# Patient Record
Sex: FEMALE | Race: WHITE
[De-identification: names, ages, dates, MRNs, and addresses within clinical notes are randomized per-mention and may not be internally consistent; named-entity substitution may affect disease eponyms.]

---

## 2019-12-27 ENCOUNTER — HOSPITAL ENCOUNTER (OUTPATIENT)
Dept: HOSPITAL 63 - MAMMO | Age: 54
Discharge: HOME | End: 2019-12-27
Attending: PHYSICIAN ASSISTANT
Payer: COMMERCIAL

## 2019-12-27 DIAGNOSIS — Z12.31: Primary | ICD-10-CM

## 2019-12-27 PROCEDURE — 77067 SCR MAMMO BI INCL CAD: CPT

## 2019-12-30 NOTE — RAD
DATE: 12/27/2019



EXAM: DIGITAL SCREEN BILAT W/CAD



HISTORY: Routine screening



COMPARISON: Prior exams are from more than 10 years ago and are not available.



This study was interpreted with the benefit of Computerized Aided Detection

(CAD).





Breast Density: SCATTERED The breast parenchyma shows scattered fibroglandular

densities. Breast parenchyma level B.





FINDINGS: Asymmetry involving the left upper breast posteriorly is present

approximately 6.6 cm from the nipple. Asymmetry involving the right central

MLO view is also present. No suspicious calcifications or distortion.  





IMPRESSION: Indeterminate asymmetries







BI-RADS CATEGORY: 0 INCOMPLETE: NEEDS ADDITIONAL IMAGING EVALUATION AND/OR

PRIOR MAMMOGRAMS FOR COMPARISON.



RECOMMENDED FOLLOW-UP: ADD ADDITIONAL IMAGING. Spot compression imaging of the

right and left breast at the upper posterior aspect recommended. Ultrasound

may be needed bilaterally.



PQRS compliance statement: Patient information was entered into a reminder

system with a target due date for the next mammogram.



Mammography is a sensitive method for finding small breast cancers, but it

does not detect them all and is not a substitute for careful clinical

examination.  A negative mammogram does not negate a clinically suspicious

finding and should not result in delay in biopsying a clinically suspicious

abnormality.



"Our facility is accredited by the [employer] Mammography

Program."

## 2020-01-14 ENCOUNTER — HOSPITAL ENCOUNTER (OUTPATIENT)
Dept: HOSPITAL 63 - MAMMO | Age: 55
Discharge: HOME | End: 2020-01-14
Attending: PHYSICIAN ASSISTANT
Payer: COMMERCIAL

## 2020-01-14 DIAGNOSIS — R92.2: ICD-10-CM

## 2020-01-14 DIAGNOSIS — N63.11: Primary | ICD-10-CM

## 2020-01-14 PROCEDURE — 77066 DX MAMMO INCL CAD BI: CPT

## 2020-01-14 PROCEDURE — 76641 ULTRASOUND BREAST COMPLETE: CPT

## 2020-01-14 NOTE — RAD
Examination:

1. Bilateral digital diagnostic mammogram

2. Bilateral Limited breast ultrasound.

 

INDICATION: Screening recall from [facility] for bilateral breast 

asymmetries.

 

COMPARISON: Bilateral screening mammogram of December 27, 2019.

 

TECHNIQUE: CC and MLO views of both breasts were obtained with 3-D 

technique and full-field ML views were obtained with 2-D technique. 

Thereafter, targeted ultrasound of both breasts in the upper outer 

quadrants was performed spanning the 9 to 12:00 position on the right and 

the 10:30 to 3:00 position on the left.

 

FINDINGS:

Bilateral mammogram:

Heterogeneously dense breast parenchyma.

 

In the right breast, with the 3-D imaging performed at this visit, an 

asymmetry in the lateral middle third right breast was observed with 

questionable associated architectural distortion (image 27 of 50 on the CC

tomographic series). More centrally in the area of screening recall, no 

persistent mammographic abnormality was appreciated.

 

In the left breast, the 3-D tomographic images suggested a possible 

asymmetry in the lateral posterior left breast (image 30 of 52 on the CC 

tomographic series) that likely correlates with the asymmetry recalled 

from screening, at the approximate 2:00 position 7 cm from the nipple.

 

Targeted ultrasound of the right breast showed an ovoid hypoechoic mass 

with posterior acoustic enhancement at the 9:30 o'clock position 8 cm from

the nipple measuring 7 x 3 mm with thin internal septations. No suspicious

sonographic findings were observed. This likely correlates with the 

mammographic finding pursued for additional imaging and is currently 

favored to represent a probable benign cluster of cysts. The questioned 

distortion could reflect prominent Man's ligaments near the edge of the

patient's fibroglandular envelope. Six-month follow-up right diagnostic 

mammogram recommended for this finding.

 

Targeted ultrasound of the left breast revealed a round hypoechoic solid 

mass with somewhat indistinct margins at the left 2:00 position 8 cm from 

the nipple. It is 3.5 mm in diameter. It is mildly suspicious (BI-RADS 

4A). Ultrasound-guided core needle biopsy is recommended. Nodularity in 

the upper inner quadrant left breast was also seen that on targeted 

ultrasound revealed a 7 mm cluster of microcysts at the 10:30 o'clock 

position 5 cm from the nipple.

 

IMPRESSION:

 

1. Suspicious round 3.5 mm mass in the left breast at the approximate 2:00

position 8 cm from the nipple. Ultrasound-guided core biopsy of this 

finding in the left breast is recommended.

 

2. Probably benign cluster of microcysts in the lateral posterior right 

breast. Right 6 month follow-up diagnostic mammogram of this finding is 

recommended.

 

BI-RADS Category 4

Findings suspicious for malignancy

Biopsy recommended

 

Electronically signed by: Juan Antonio Beasley MD (1/14/2020 5:37 PM) 

[facility]

## 2020-01-28 ENCOUNTER — HOSPITAL ENCOUNTER (OUTPATIENT)
Dept: HOSPITAL 61 - US | Age: 55
Discharge: HOME | End: 2020-01-28
Payer: COMMERCIAL

## 2020-01-28 DIAGNOSIS — N63.20: Primary | ICD-10-CM

## 2020-01-28 PROCEDURE — 19083 BX BREAST 1ST LESION US IMAG: CPT

## 2020-01-28 PROCEDURE — C1713 ANCHOR/SCREW BN/BN,TIS/BN: HCPCS

## 2020-01-28 PROCEDURE — 88305 TISSUE EXAM BY PATHOLOGIST: CPT

## 2020-01-28 PROCEDURE — 76942 ECHO GUIDE FOR BIOPSY: CPT

## 2020-01-28 PROCEDURE — 77065 DX MAMMO INCL CAD UNI: CPT

## 2020-01-28 NOTE — RAD
EXAM: 

1. ULTRASOUND-GUIDED CORE BIOPSY LEFT BREAST WITH CLIP PLACEMENT.

2. POSTCLIP DIAGNOSTIC LEFT MAMMOGRAPHY.

 

HISTORY: Left breast mass. Ultrasound-guided biopsy is requested.

 

FINDINGS:  The procedure along with its risks and benefits were explained 

to the patient. She agreed to proceed. A timeout procedure was performed.

 

Sonographic evaluation of the left breast redemonstrates the lesion of 

concern. A 3 mm hypoechoic mass at the 2:00 position 8 cm from the nipple 

is redemonstrated. This appears more cystic on real-time imaging. This was

discussed with the patient who prefers biopsy. The overlying skin was 

sterilely prepped and infiltrated with 1% lidocaine for local anesthesia. 

Under ultrasound guidance, 2 core needle specimens of the target lesion 

were obtained using a 14-gauge biopsy device. These were submitted in 

formalin. The lesion decompressed and was no longer identifiable after 

biopsy, consistent with a cyst. A postbiopsy clip was placed at its former

site under ultrasound guidance. Pressure was held to hemostasis. There 

were no immediate complications.

 

Full-field digital mammographic views of the left breast were obtained in 

CC and MLO projections and interpreted on a dedicated workstation. They 

demonstrate the clip in correspondence with the site of the target lesion.

 

IMPRESSION:

1. Successful ultrasound-guided left breast biopsy with clip placement. 

The lesion decompressed and vanished, consistent with a cyst.

2. The postbiopsy clip corresponds with the side of the target lesion.

 

Electronically signed by: CHANDANA Lawton MD (1/28/2020 4:09 PM) [facility]

## 2020-01-28 NOTE — RAD
EXAM: 

1. ULTRASOUND-GUIDED CORE BIOPSY LEFT BREAST WITH CLIP PLACEMENT.

2. POSTCLIP DIAGNOSTIC LEFT MAMMOGRAPHY.

 

HISTORY: Left breast mass. Ultrasound-guided biopsy is requested.

 

FINDINGS:  The procedure along with its risks and benefits were explained 

to the patient. She agreed to proceed. A timeout procedure was performed.

 

Sonographic evaluation of the left breast redemonstrates the lesion of 

concern. A 3 mm hypoechoic mass at the 2:00 position 8 cm from the nipple 

is redemonstrated. This appears more cystic on real-time imaging. This was

discussed with the patient who prefers biopsy. The overlying skin was 

sterilely prepped and infiltrated with 1% lidocaine for local anesthesia. 

Under ultrasound guidance, 2 core needle specimens of the target lesion 

were obtained using a 14-gauge biopsy device. These were submitted in 

formalin. The lesion decompressed and was no longer identifiable after 

biopsy, consistent with a cyst. A postbiopsy clip was placed at its former

site under ultrasound guidance. Pressure was held to hemostasis. There 

were no immediate complications.

 

Full-field digital mammographic views of the left breast were obtained in 

CC and MLO projections and interpreted on a dedicated workstation. They 

demonstrate the clip in correspondence with the site of the target lesion.

 

IMPRESSION:

1. Successful ultrasound-guided left breast biopsy with clip placement. 

The lesion decompressed and vanished, consistent with a cyst.

2. The postbiopsy clip corresponds with the side of the target lesion.

 

Electronically signed by: CHANDANA Lawton MD (1/28/2020 4:09 PM) [facility]

## 2020-01-30 NOTE — PATHOLOGY
[facility] Accession Number: [id number]

.                                                                01

Material submitted:                                        .

breast - LEFT BREAST MASS, 2:00, 8CMFN. Modifiers: left, 2:00

.                                                                01

Clinical history:                                          .

Left breast mass 2:00 8 cm from nipple, 3.5 mm

.                                                                02

**********************************************************************

Diagnosis:

Breast tissue, left breast mass 2:00, needle biopsy:

- Segment of benign fibroadipose tissue identified.

.

(JP:mml; 1/30/2020)

[facility]  01/30/2020  1419 Local

**********************************************************************

.                                                                02

Comment:

There is no evidence of malignancy.  Please correlate with mammographic

findings.

.

(JPM:mml; 1/30/2020)

.                                                                02

Electronically signed:                                     .

Frederick Bianchi MD, Pathologist

NPI- [id number]

.                                                                01

Gross description:                                         .

The specimen is received in formalin, labeled "Roshni Ndiaye, left breast".

Received is a single needle core of fibrofatty tissue measuring 0.6 cm in

length by 0.2 cm in diameter.  The specimen is submitted entirely in

cassette A1.  The cold ischemic time is less than 1 minute.  The total

formalin fixation time is 32 hours and 45 minutes.

([facility]; 1/29/2020)

QAC/QAC  01/29/2020  1558 Local

.                                                                02

Pathologist provided ICD-10:

N63.20

.                                                                02

CPT                                                        .

[id number]

Specimen Comment: A courtesy copy of this report has been sent to [phone], [phone]

Specimen Comment: [id number]

Specimen Comment: Report sent to  / DR HAHN

***Performed at:  01

   [address], [place], [place]  [id number]

   MD Joe Harmon MD Phone:  [phone]

***Performed at:  02

   [address]  [id number]

   MD Frederick Bianchi MD Phone:  [phone]